# Patient Record
Sex: FEMALE | Race: NATIVE HAWAIIAN OR OTHER PACIFIC ISLANDER | HISPANIC OR LATINO
[De-identification: names, ages, dates, MRNs, and addresses within clinical notes are randomized per-mention and may not be internally consistent; named-entity substitution may affect disease eponyms.]

---

## 2019-08-27 ENCOUNTER — TRANSCRIPTION ENCOUNTER (OUTPATIENT)
Age: 34
End: 2019-08-27

## 2019-10-23 ENCOUNTER — RECORD ABSTRACTING (OUTPATIENT)
Age: 34
End: 2019-10-23

## 2019-10-23 DIAGNOSIS — Z01.419 ENCOUNTER FOR GYNECOLOGICAL EXAMINATION (GENERAL) (ROUTINE) W/OUT ABNORMAL FINDINGS: ICD-10-CM

## 2019-10-23 DIAGNOSIS — Z30.431 ENCOUNTER FOR ROUTINE CHECKING OF INTRAUTERINE CONTRACEPTIVE DEVICE: ICD-10-CM

## 2019-10-23 DIAGNOSIS — Z78.9 OTHER SPECIFIED HEALTH STATUS: ICD-10-CM

## 2019-10-23 DIAGNOSIS — Z83.3 FAMILY HISTORY OF DIABETES MELLITUS: ICD-10-CM

## 2019-10-23 DIAGNOSIS — R10.2 PELVIC AND PERINEAL PAIN: ICD-10-CM

## 2019-10-23 DIAGNOSIS — Z80.3 FAMILY HISTORY OF MALIGNANT NEOPLASM OF BREAST: ICD-10-CM

## 2019-10-23 PROBLEM — Z00.00 ENCOUNTER FOR PREVENTIVE HEALTH EXAMINATION: Status: ACTIVE | Noted: 2019-10-23

## 2019-10-23 LAB — CYTOLOGY CVX/VAG DOC THIN PREP: NEGATIVE

## 2019-10-24 ENCOUNTER — APPOINTMENT (OUTPATIENT)
Dept: OBGYN | Facility: CLINIC | Age: 34
End: 2019-10-24
Payer: MEDICAID

## 2019-10-24 VITALS
SYSTOLIC BLOOD PRESSURE: 110 MMHG | HEIGHT: 64 IN | DIASTOLIC BLOOD PRESSURE: 70 MMHG | BODY MASS INDEX: 18.9 KG/M2 | WEIGHT: 110.7 LBS

## 2019-10-24 DIAGNOSIS — Z11.3 ENCOUNTER FOR SCREENING FOR INFECTIONS WITH A PREDOMINANTLY SEXUAL MODE OF TRANSMISSION: ICD-10-CM

## 2019-10-24 DIAGNOSIS — Z01.419 ENCOUNTER FOR GYNECOLOGICAL EXAMINATION (GENERAL) (ROUTINE) W/OUT ABNORMAL FINDINGS: ICD-10-CM

## 2019-10-24 PROCEDURE — 99395 PREV VISIT EST AGE 18-39: CPT

## 2019-10-24 RX ORDER — COPPER 313.4 MG/1
INTRAUTERINE DEVICE INTRAUTERINE
Refills: 0 | Status: ACTIVE | COMMUNITY

## 2019-10-24 NOTE — HISTORY OF PRESENT ILLNESS
[Normal Amount/Duration] : was of a normal amount and duration [Frequency: Q ___ days] : menstrual periods occur approximately every [unfilled] days [Menarche Age: ____] : age at menarche was [unfilled] [Contraception] : uses contraception [Sexually Active] : is sexually active [IUD] : has an intrauterine device [Spotting Between  Menses] : no spotting between menses [de-identified] : ParaGard

## 2019-10-25 LAB
HAV IGM SER QL: NONREACTIVE
HBV CORE IGM SER QL: NONREACTIVE
HBV SURFACE AG SER QL: NONREACTIVE
HCV AB SER QL: NONREACTIVE
HCV S/CO RATIO: 0.2 S/CO
HIV1+2 AB SPEC QL IA.RAPID: NONREACTIVE
T PALLIDUM AB SER QL IA: NEGATIVE

## 2019-10-28 LAB
HBV SURFACE AG SER QL: NONREACTIVE
HCV AB SER QL: NONREACTIVE
HCV S/CO RATIO: 0.21 S/CO
HPV HIGH+LOW RISK DNA PNL CVX: NOT DETECTED

## 2019-10-29 LAB
C TRACH RRNA SPEC QL NAA+PROBE: NOT DETECTED
N GONORRHOEA RRNA SPEC QL NAA+PROBE: NOT DETECTED
SOURCE TP AMPLIFICATION: NORMAL

## 2019-10-31 LAB — CYTOLOGY CVX/VAG DOC THIN PREP: NORMAL

## 2019-12-25 PROBLEM — R10.2 PELVIC PAIN IN FEMALE: Status: RESOLVED | Noted: 2019-10-23 | Resolved: 2019-12-25

## 2020-03-12 ENCOUNTER — TRANSCRIPTION ENCOUNTER (OUTPATIENT)
Age: 35
End: 2020-03-12

## 2020-12-21 PROBLEM — Z01.419 ENCOUNTER FOR GYNECOLOGICAL EXAMINATION: Status: RESOLVED | Noted: 2019-10-24 | Resolved: 2020-12-21

## 2022-07-13 ENCOUNTER — APPOINTMENT (OUTPATIENT)
Dept: OBGYN | Facility: CLINIC | Age: 37
End: 2022-07-13

## 2022-07-29 ENCOUNTER — APPOINTMENT (OUTPATIENT)
Dept: OBGYN | Facility: CLINIC | Age: 37
End: 2022-07-29

## 2022-08-24 ENCOUNTER — APPOINTMENT (OUTPATIENT)
Dept: OBGYN | Facility: CLINIC | Age: 37
End: 2022-08-24

## 2022-08-24 VITALS
HEIGHT: 64 IN | DIASTOLIC BLOOD PRESSURE: 60 MMHG | SYSTOLIC BLOOD PRESSURE: 100 MMHG | WEIGHT: 117.44 LBS | BODY MASS INDEX: 20.05 KG/M2

## 2022-08-24 DIAGNOSIS — T83.32XA DISPLACEMENT OF INTRAUTERINE CONTRACEPTIVE DEVICE, INITIAL ENCOUNTER: ICD-10-CM

## 2022-08-24 DIAGNOSIS — Z30.432 ENCOUNTER FOR REMOVAL OF INTRAUTERINE CONTRACEPTIVE DEVICE: ICD-10-CM

## 2022-08-24 LAB
HCG UR QL: NEGATIVE
QUALITY CONTROL: YES

## 2022-08-24 PROCEDURE — 58301 REMOVE INTRAUTERINE DEVICE: CPT

## 2022-08-24 PROCEDURE — 81025 URINE PREGNANCY TEST: CPT

## 2022-08-24 NOTE — PROCEDURE
[IUD Removal] : intrauterine device (IUD) removal [Time out performed] : Pre-procedure time out performed.  Patient's name, date of birth and procedure confirmed. [Consent Obtained] : Consent obtained [Fertility Desired] : fertility desired [Risks] : risks [Benefits] : benefits [Alternatives] : alternatives [Patient] : patient [Speculum Placed] : speculum placed [Strings Visualized] : strings visualized [IUD Discarded] : IUD discarded [Sent to Pathology] : specimen was placed in buffered formalin and sent for pathology [Tolerated Well] : Patient tolerated the procedure well [No Complications] : no complications [Heavy Vaginal Bleeding] : for heavy vaginal bleeding [Pelvic Pain] : for pelvic pain [de-identified] : The strings were grasped.  Resistance was noted.  Upon removal of the IUD a portion of the IUD was noted to be missing.  A hysteroscopy was then performed revealing that portion to be embedded in the uterine wall.

## 2022-09-02 ENCOUNTER — APPOINTMENT (OUTPATIENT)
Dept: OBGYN | Facility: CLINIC | Age: 37
End: 2022-09-02

## 2022-09-02 VITALS
HEIGHT: 64 IN | SYSTOLIC BLOOD PRESSURE: 112 MMHG | DIASTOLIC BLOOD PRESSURE: 63 MMHG | BODY MASS INDEX: 19.97 KG/M2 | WEIGHT: 117 LBS

## 2022-09-02 DIAGNOSIS — T83.39XA OTHER MECHANICAL COMPLICATION OF INTRAUTERINE CONTRACEPTIVE DEVICE, INITIAL ENCOUNTER: ICD-10-CM

## 2022-09-02 PROCEDURE — 81025 URINE PREGNANCY TEST: CPT

## 2022-09-02 PROCEDURE — 99213 OFFICE O/P EST LOW 20 MIN: CPT

## 2022-09-02 NOTE — PROCEDURE
[Hysteroscopy] : Hysteroscopy [Time out performed] : Pre-procedure time out performed.  Patient's name, date of birth and procedure confirmed. [Consent Obtained] : Consent obtained [Risks] : risks [Benefits] : benefits [Alternatives] : alternatives [Patient] : patient [Infection] : infection [Bleeding] : bleeding [Allergic Reaction] : allergic reaction [flexible] : Using aseptic technique a hysteroscopy was performed using a flexible hysteroscope [Hemostasis obtained] : hemostasis obtained [Tolerated Well] : Patient tolerated the procedure well [Aftercare instructions/regstrictions given and follow-up scheduled] : Aftercare instructions/restrictions given and follow-up scheduled [de-identified] : removal of portion of IUD [de-identified] : portion of IUD not seen

## 2022-09-03 LAB
HCG UR QL: NEGATIVE
QUALITY CONTROL: YES

## 2023-11-02 ENCOUNTER — NON-APPOINTMENT (OUTPATIENT)
Age: 38
End: 2023-11-02

## 2023-11-03 ENCOUNTER — NON-APPOINTMENT (OUTPATIENT)
Age: 38
End: 2023-11-03

## 2023-12-01 ENCOUNTER — APPOINTMENT (OUTPATIENT)
Dept: ANTEPARTUM | Facility: CLINIC | Age: 38
End: 2023-12-01

## 2023-12-23 ENCOUNTER — RESULT REVIEW (OUTPATIENT)
Age: 38
End: 2023-12-23

## 2023-12-23 ENCOUNTER — OUTPATIENT (OUTPATIENT)
Dept: OUTPATIENT SERVICES | Facility: HOSPITAL | Age: 38
LOS: 1 days | End: 2023-12-23
Payer: MEDICARE

## 2023-12-23 VITALS — HEART RATE: 110 BPM | SYSTOLIC BLOOD PRESSURE: 120 MMHG | DIASTOLIC BLOOD PRESSURE: 63 MMHG

## 2023-12-23 VITALS — DIASTOLIC BLOOD PRESSURE: 56 MMHG | SYSTOLIC BLOOD PRESSURE: 99 MMHG | HEART RATE: 82 BPM

## 2023-12-23 DIAGNOSIS — O26.899 OTHER SPECIFIED PREGNANCY RELATED CONDITIONS, UNSPECIFIED TRIMESTER: ICD-10-CM

## 2023-12-23 LAB
ALBUMIN SERPL ELPH-MCNC: 3.1 G/DL — LOW (ref 3.3–5.2)
ALBUMIN SERPL ELPH-MCNC: 3.1 G/DL — LOW (ref 3.3–5.2)
ALP SERPL-CCNC: 109 U/L — SIGNIFICANT CHANGE UP (ref 40–120)
ALP SERPL-CCNC: 109 U/L — SIGNIFICANT CHANGE UP (ref 40–120)
ALT FLD-CCNC: 25 U/L — SIGNIFICANT CHANGE UP
ALT FLD-CCNC: 25 U/L — SIGNIFICANT CHANGE UP
AMYLASE P1 CFR SERPL: 83 U/L — SIGNIFICANT CHANGE UP (ref 36–128)
AMYLASE P1 CFR SERPL: 83 U/L — SIGNIFICANT CHANGE UP (ref 36–128)
ANION GAP SERPL CALC-SCNC: 16 MMOL/L — SIGNIFICANT CHANGE UP (ref 5–17)
ANION GAP SERPL CALC-SCNC: 16 MMOL/L — SIGNIFICANT CHANGE UP (ref 5–17)
AST SERPL-CCNC: 26 U/L — SIGNIFICANT CHANGE UP
AST SERPL-CCNC: 26 U/L — SIGNIFICANT CHANGE UP
BILIRUB SERPL-MCNC: 0.3 MG/DL — LOW (ref 0.4–2)
BILIRUB SERPL-MCNC: 0.3 MG/DL — LOW (ref 0.4–2)
BUN SERPL-MCNC: 8.2 MG/DL — SIGNIFICANT CHANGE UP (ref 8–20)
BUN SERPL-MCNC: 8.2 MG/DL — SIGNIFICANT CHANGE UP (ref 8–20)
CALCIUM SERPL-MCNC: 8.7 MG/DL — SIGNIFICANT CHANGE UP (ref 8.4–10.5)
CALCIUM SERPL-MCNC: 8.7 MG/DL — SIGNIFICANT CHANGE UP (ref 8.4–10.5)
CHLORIDE SERPL-SCNC: 100 MMOL/L — SIGNIFICANT CHANGE UP (ref 96–108)
CHLORIDE SERPL-SCNC: 100 MMOL/L — SIGNIFICANT CHANGE UP (ref 96–108)
CO2 SERPL-SCNC: 18 MMOL/L — LOW (ref 22–29)
CO2 SERPL-SCNC: 18 MMOL/L — LOW (ref 22–29)
CREAT SERPL-MCNC: 0.34 MG/DL — LOW (ref 0.5–1.3)
CREAT SERPL-MCNC: 0.34 MG/DL — LOW (ref 0.5–1.3)
EGFR: 137 ML/MIN/1.73M2 — SIGNIFICANT CHANGE UP
EGFR: 137 ML/MIN/1.73M2 — SIGNIFICANT CHANGE UP
GLUCOSE SERPL-MCNC: 74 MG/DL — SIGNIFICANT CHANGE UP (ref 70–99)
GLUCOSE SERPL-MCNC: 74 MG/DL — SIGNIFICANT CHANGE UP (ref 70–99)
HCT VFR BLD CALC: 27.8 % — LOW (ref 34.5–45)
HCT VFR BLD CALC: 27.8 % — LOW (ref 34.5–45)
HGB BLD-MCNC: 9.1 G/DL — LOW (ref 11.5–15.5)
HGB BLD-MCNC: 9.1 G/DL — LOW (ref 11.5–15.5)
LIDOCAIN IGE QN: 30 U/L — SIGNIFICANT CHANGE UP (ref 22–51)
LIDOCAIN IGE QN: 30 U/L — SIGNIFICANT CHANGE UP (ref 22–51)
MCHC RBC-ENTMCNC: 25.6 PG — LOW (ref 27–34)
MCHC RBC-ENTMCNC: 25.6 PG — LOW (ref 27–34)
MCHC RBC-ENTMCNC: 32.7 GM/DL — SIGNIFICANT CHANGE UP (ref 32–36)
MCHC RBC-ENTMCNC: 32.7 GM/DL — SIGNIFICANT CHANGE UP (ref 32–36)
MCV RBC AUTO: 78.3 FL — LOW (ref 80–100)
MCV RBC AUTO: 78.3 FL — LOW (ref 80–100)
PLATELET # BLD AUTO: 341 K/UL — SIGNIFICANT CHANGE UP (ref 150–400)
PLATELET # BLD AUTO: 341 K/UL — SIGNIFICANT CHANGE UP (ref 150–400)
POTASSIUM SERPL-MCNC: 4 MMOL/L — SIGNIFICANT CHANGE UP (ref 3.5–5.3)
POTASSIUM SERPL-MCNC: 4 MMOL/L — SIGNIFICANT CHANGE UP (ref 3.5–5.3)
POTASSIUM SERPL-SCNC: 4 MMOL/L — SIGNIFICANT CHANGE UP (ref 3.5–5.3)
POTASSIUM SERPL-SCNC: 4 MMOL/L — SIGNIFICANT CHANGE UP (ref 3.5–5.3)
PROT SERPL-MCNC: 6.3 G/DL — LOW (ref 6.6–8.7)
PROT SERPL-MCNC: 6.3 G/DL — LOW (ref 6.6–8.7)
RBC # BLD: 3.55 M/UL — LOW (ref 3.8–5.2)
RBC # BLD: 3.55 M/UL — LOW (ref 3.8–5.2)
RBC # FLD: 13.7 % — SIGNIFICANT CHANGE UP (ref 10.3–14.5)
RBC # FLD: 13.7 % — SIGNIFICANT CHANGE UP (ref 10.3–14.5)
SODIUM SERPL-SCNC: 134 MMOL/L — LOW (ref 135–145)
SODIUM SERPL-SCNC: 134 MMOL/L — LOW (ref 135–145)
WBC # BLD: 10.39 K/UL — SIGNIFICANT CHANGE UP (ref 3.8–10.5)
WBC # BLD: 10.39 K/UL — SIGNIFICANT CHANGE UP (ref 3.8–10.5)
WBC # FLD AUTO: 10.39 K/UL — SIGNIFICANT CHANGE UP (ref 3.8–10.5)
WBC # FLD AUTO: 10.39 K/UL — SIGNIFICANT CHANGE UP (ref 3.8–10.5)

## 2023-12-23 PROCEDURE — 85027 COMPLETE CBC AUTOMATED: CPT

## 2023-12-23 PROCEDURE — 96375 TX/PRO/DX INJ NEW DRUG ADDON: CPT

## 2023-12-23 PROCEDURE — 76705 ECHO EXAM OF ABDOMEN: CPT

## 2023-12-23 PROCEDURE — 96372 THER/PROPH/DIAG INJ SC/IM: CPT

## 2023-12-23 PROCEDURE — G0463: CPT

## 2023-12-23 PROCEDURE — 80053 COMPREHEN METABOLIC PANEL: CPT

## 2023-12-23 PROCEDURE — 59025 FETAL NON-STRESS TEST: CPT

## 2023-12-23 PROCEDURE — 36415 COLL VENOUS BLD VENIPUNCTURE: CPT

## 2023-12-23 PROCEDURE — 82150 ASSAY OF AMYLASE: CPT

## 2023-12-23 PROCEDURE — 76705 ECHO EXAM OF ABDOMEN: CPT | Mod: 26

## 2023-12-23 PROCEDURE — 96374 THER/PROPH/DIAG INJ IV PUSH: CPT

## 2023-12-23 PROCEDURE — 83690 ASSAY OF LIPASE: CPT

## 2023-12-23 RX ORDER — ACETAMINOPHEN 500 MG
975 TABLET ORAL ONCE
Refills: 0 | Status: DISCONTINUED | OUTPATIENT
Start: 2023-12-23 | End: 2023-12-23

## 2023-12-23 RX ORDER — SODIUM CHLORIDE 9 MG/ML
1000 INJECTION, SOLUTION INTRAVENOUS ONCE
Refills: 0 | Status: DISCONTINUED | OUTPATIENT
Start: 2023-12-23 | End: 2024-01-07

## 2023-12-23 RX ORDER — ACETAMINOPHEN 500 MG
1000 TABLET ORAL ONCE
Refills: 0 | Status: COMPLETED | OUTPATIENT
Start: 2023-12-23 | End: 2023-12-23

## 2023-12-23 RX ORDER — FAMOTIDINE 10 MG/ML
20 INJECTION INTRAVENOUS ONCE
Refills: 0 | Status: COMPLETED | OUTPATIENT
Start: 2023-12-23 | End: 2023-12-23

## 2023-12-23 RX ADMIN — Medication 12 MILLIGRAM(S): at 22:36

## 2023-12-23 RX ADMIN — Medication 1000 MILLIGRAM(S): at 21:00

## 2023-12-23 RX ADMIN — Medication 400 MILLIGRAM(S): at 20:03

## 2023-12-23 RX ADMIN — FAMOTIDINE 20 MILLIGRAM(S): 10 INJECTION INTRAVENOUS at 20:12

## 2023-12-23 NOTE — OB PROVIDER TRIAGE NOTE - NSHPPHYSICALEXAM_GEN_ALL_CORE
T(C): 36.5 (12-23-23 @ 19:00), Max: 36.5 (12-23-23 @ 19:00)  HR: 95 (12-23-23 @ 19:26) (95 - 110)  BP: 127/61 (12-23-23 @ 19:26) (120/63 - 127/61)  RR: 17 (12-23-23 @ 19:00) (17 - 17)    Gen: well-appearing. Very uncomfortable with abdominal pain when present.   Abd: soft, gravid. Tender in RUQ. No tenderness in lower abdomen.   Ext: non-edematous, non-tender   SVE: 1/0/-3 firm  SSE: deferred  FHT: baseline 140, moderate variability, +accels, -decels   [place]: ctx q 2-4min T(C): 36.5 (12-23-23 @ 19:00), Max: 36.5 (12-23-23 @ 19:00)  HR: 95 (12-23-23 @ 19:26) (95 - 110)  BP: 127/61 (12-23-23 @ 19:26) (120/63 - 127/61)  RR: 17 (12-23-23 @ 19:00) (17 - 17)    Gen: well-appearing. Very uncomfortable with abdominal pain when present.   Abd: soft, gravid. Tender in RUQ. No tenderness in lower abdomen.   Ext: non-edematous, non-tender   SVE: 1/0/-3 firm  SSE: deferred  FHT: baseline 140, moderate variability, +accels, -decels   [place]: ctx q 2-4min T(C): 36.5 (12-23-23 @ 19:00), Max: 36.5 (12-23-23 @ 19:00)  HR: 95 (12-23-23 @ 19:26) (95 - 110)  BP: 127/61 (12-23-23 @ 19:26) (120/63 - 127/61)  RR: 17 (12-23-23 @ 19:00) (17 - 17)    Gen: well-appearing. Very uncomfortable with abdominal pain when present.   Abd: soft, gravid. Tender in RUQ. No tenderness in lower abdomen.   Ext: non-edematous, non-tender   SVE: 1/0/-3 firm  SSE: deferred  FHT: baseline 140, moderate variability, +accels, -decels   [place]: ctx q 2-4min  Bedside sono: vertex, BPP 8/8. SHIRLEY 14.4 T(C): 36.5 (12-23-23 @ 19:00), Max: 36.5 (12-23-23 @ 19:00)  HR: 95 (12-23-23 @ 19:26) (95 - 110)  BP: 127/61 (12-23-23 @ 19:26) (120/63 - 127/61)  RR: 17 (12-23-23 @ 19:00) (17 - 17)    Gen: well-appearing. Very uncomfortable with abdominal pain when present.   Abd: soft, gravid. Tender in RUQ. No tenderness in lower abdomen.   Ext: non-edematous, non-tender   SVE: 1/0/-3 firm  SSE: deferred  FHT: baseline 140, moderate variability, +accels, -decels   [place]: ctx q 2-4min  Bedside sono: vertex, BPP 8/8. SHIRLEY 14.4

## 2023-12-23 NOTE — OB RN PATIENT PROFILE - BREASTFEEDING MOTHER TAUGHT HOW TO HAND EXPRESS THEIR OWN MILK
Max is eating ad livia demand and taking 55 ml, 20 ml and 30 ml. Circ completed and baby is frantic and hungry and will not suck on bottle, NT full feeding of 70 ml with no issues. Max sleeping after NT feeding. Circ is healing, scant bleeding noted, no void since circumcision, mom and dad taught circumcision care and are able to talk back plan. Baby has void and stool this shift. No apnea, bradycardia or desaturations.    Statement Selected

## 2023-12-23 NOTE — OB PROVIDER TRIAGE NOTE - NSHPLABSRESULTS_GEN_ALL_CORE
9.1    10.39 )-----------( 341      ( 23 Dec 2023 19:50 )             27.8     12-23    134<L>  |  100  |  8.2  ----------------------------<  74  4.0   |  18.0<L>  |  0.34<L>    Ca    8.7      23 Dec 2023 19:50    TPro  6.3<L>  /  Alb  3.1<L>  /  TBili  0.3<L>  /  DBili  x   /  AST  26  /  ALT  25  /  AlkPhos  109  12-23      Amylase: 83 U/L (12.23.23 @ 19:50)  Lipase: 30 U/L (12.23.23 @ 19:50)

## 2023-12-23 NOTE — OB PROVIDER TRIAGE NOTE - NSOBPROVIDERNOTE_OBGYN_ALL_OB_FT
A/P: 38y  at 34w4d GA who presents to L&D for intermittent upper abdominal pain that started earlier today  -IVF for contractions  -Ofirmev and Pepcid for symptomatic relief  -Cervical exam /-3 and still firm. Consider re-check   -RUQ sono ordered.  -CBC, CMP, amylase and lipase pending  Fetus: reactive reassuring  [place]: ctx 2-4min  Dispo: Continue to observe.     Discussed with  A/P: 38y  at 34w4d GA who presents to L&D for intermittent upper abdominal pain that started earlier today  -IVF for contractions  -Ofirmev and Pepcid for symptomatic relief  -Cervical exam /-3 and still firm. Consider re-check   -RUQ sono ordered.  -CBC, CMP, amylase and lipase pending  Fetus: reactive reassuring  [place]: ctx 2-4min  Dispo: Continue to observe.     Discussed with  A/P: 38y  at 34w4d GA who presents to L&D for intermittent upper abdominal pain that started earlier today  -IVF for contractions  -Ofirmev and Pepcid for symptomatic relief  -Cervical exam /-3 and still firm. Consider re-check   -RUQ sono ordered.  -CBC, CMP, amylase and lipase pending  Fetus: reactive reassuring  [place]: ctx 2-4min  Dispo: Continue to observe.     Discussed with  and Dr. Gamez    +++++++ Addendum+++    Pt seen and examined. Agree with above. Labs pending. BPP . Pain improved with IV tylenol.   Will continue to observe. A/P: 38y  at 34w4d GA who presents to L&D for intermittent upper abdominal pain that started earlier today  -IVF for contractions  -Ofirmev and Pepcid for symptomatic relief  -Cervical exam /-3 and still firm. Consider re-check   -RUQ sono ordered.  -CBC, CMP, amylase and lipase pending  Fetus: reactive reassuring  [place]: ctx 2-4min  Dispo: Continue to observe.     Discussed with  and Dr. Gamez    +++++++ Addendum+++    Pt seen and examined. Agree with above. Labs pending. BPP . Pain improved with IV tylenol.   Will continue to observe. A/P: 38y  at 34w4d GA who presents to L&D for intermittent upper abdominal pain that started earlier today  -IVF for contractions  -Ofirmev and Pepcid for symptomatic relief  -Cervical exam /3 and still firm. Exam is unchanged on re-exam several hours later  -RUQ sono in progress  -CBC, CMP, amylase and lipase wnl. Anemia of 9.1, recommended restarting iron supplementation  Fetus: reactive reassuring  [place]: ctx 2-4min  Dispo: Continue to observe.     Discussed with  and Dr. Gamez    +++++++ Addendum+++    Pt seen and examined. Agree with above. Labs pending. BPP . Pain improved with IV tylenol.   Will continue to observe.      Addendum [place] PGY1 @ 2200:  -Pt has symptomatic relief. Pain is resolved.   -Cervical exam is stable on re-check  -Recommended Iron supplementation for anemia and Tylenol for pain relief at home  -Reccomended to call OB provider if pain returns  -Pending RUQ sono A/P: 38y  at 34w4d GA who presents to L&D for intermittent upper abdominal pain that started earlier today  -IVF for contractions  -Ofirmev and Pepcid for symptomatic relief  -Cervical exam /3 and still firm. Exam is unchanged on re-exam several hours later  -RUQ sono in progress  -CBC, CMP, amylase and lipase wnl. Anemia of 9.1, recommended restarting iron supplementation  Fetus: reactive reassuring  [place]: ctx 2-4min  Dispo: Continue to observe.     Discussed with  and Dr. Gamez    +++++++ Addendum+++    Pt seen and examined. Agree with above. Labs pending. BPP . Pain improved with IV tylenol.   Will continue to observe.      Addendum [place] PGY1 @ 2200:  -Pt has symptomatic relief. Pain is resolved.   -Cervical exam is stable on re-check  -Recommended Iron supplementation for anemia and Tylenol for pain relief at home  -Reccomended to call OB provider if pain returns  -Pending RUQ sono A/P: 38y  at 34w4d GA who presents to L&D for intermittent upper abdominal pain that started earlier today  -IVF for contractions  -Ofirmev and Pepcid for symptomatic relief  -Cervical exam /-3 and still firm. Exam is unchanged on re-exam several hours later  -RUQ sono in progress  -CBC, CMP, amylase and lipase wnl. Anemia of 9.1, recommended restarting iron supplementation  Fetus: reactive reassuring  [place]: ctx 2-4min  Dispo: Continue to observe.     Discussed with  and Dr. Gamez    +++++++ Addendum+++    Pt seen and examined. Agree with above. Labs pending. BPP . Pain improved with IV tylenol.   Will continue to observe.      Addendum [place] PGY1 @ 2200:  -Pt has symptomatic relief. Pain is resolved.   -Cervical exam is stable on re-check  -Recommended Iron supplementation for anemia and Tylenol for pain relief at home  -Reccomended to call OB provider if pain returns  -Pending RUQ sono  -Ctx have spaced out on toco and decreased in intensity clinically A/P: 38y  at 34w4d GA who presents to L&D for intermittent upper abdominal pain that started earlier today  -IVF for contractions  -Ofirmev and Pepcid for symptomatic relief  -Cervical exam /-3 and still firm. Exam is unchanged on re-exam several hours later  -RUQ sono in progress  -CBC, CMP, amylase and lipase wnl. Anemia of 9.1, recommended restarting iron supplementation  Fetus: reactive reassuring  [place]: ctx 2-4min  Dispo: Continue to observe.     Discussed with  and Dr. Gamez    +++++++ Addendum+++    Pt seen and examined. Agree with above. Labs pending. BPP . Pain improved with IV tylenol.   Will continue to observe.      Addendum [place] PGY1 @ 2200:  -Pt has symptomatic relief. Pain is resolved.   -Cervical exam is stable on re-check  -Recommended Iron supplementation for anemia and Tylenol for pain relief at home  -Reccomended to call OB provider if pain returns  -Pending RUQ sono  -Ctx have spaced out on toco and decreased in intensity clinically A/P: 38y  at 34w4d GA who presents to L&D for intermittent upper abdominal pain that started earlier today  -IVF for contractions  -Ofirmev and Pepcid for symptomatic relief  -Cervical exam /-3 and still firm. Exam is unchanged on re-exam several hours later  -RUQ sono in progress  -CBC, CMP, amylase and lipase wnl. Anemia of 9.1, recommended restarting iron supplementation  Fetus: reactive reassuring  [place]: ctx 2-4min  Dispo: Continue to observe.     Discussed with  and Dr. Gamez    +++++++ Addendum+++    Pt seen and examined. Agree with above. Labs pending. BPP . Pain improved with IV tylenol.   Will continue to observe.      Addendum [place] PGY1 @ 2200:  -Pt has symptomatic relief. Pain is resolved.   -Cervical exam is stable on re-check  -Recommended Iron supplementation for anemia and Tylenol for pain relief at home  -Reccomended to call OB provider if pain returns  -RUQ sono read wnl  -Ctx have spaced out on toco and decreased in intensity clinically  -Betamethasone now for fetal lung maturity. Discussed this with the patient. She will return tomorrow night for second dose of betamethasone, NST, and cervical exam. Return precautions discussed at length with the patient.    Dispo: stable for d/c home with return to triage tomorrow night    Discussed with Dr. Gamez and Dr Colon A/P: 38y  at 34w4d GA who presents to L&D for intermittent upper abdominal pain that started earlier today  -IVF for contractions  -Ofirmev and Pepcid for symptomatic relief  -Cervical exam /-3 and still firm. Exam is unchanged on re-exam several hours later  -RUQ sono in progress  -CBC, CMP, amylase and lipase wnl. Anemia of 9.1, recommended restarting iron supplementation  Fetus: reactive reassuring  [place]: ctx 2-4min  Dispo: Continue to observe.     Discussed with  and Dr. Gamez    +++++++ Addendum+++    Pt seen and examined. Agree with above. Labs pending. BPP . Pain improved with IV tylenol.   Will continue to observe.      Addendum [place] PGY1 @ 2200:  -Pt has symptomatic relief. Pain is resolved.   -Cervical exam is stable on re-check  -Recommended Iron supplementation for anemia and Tylenol for pain relief at home  -Reccomended to call OB provider if pain returns  -RUQ sono read wnl  -Ctx have spaced out on toco and decreased in intensity clinically  -Betamethasone now for fetal lung maturity. Discussed this with the patient. She will return tomorrow night for second dose of betamethasone, NST, and cervical exam. Return precautions discussed at length with the patient.    Dispo: stable for d/c home with return to triage tomorrow night    Discussed with Dr. Gamez and Dr Colon

## 2023-12-23 NOTE — OB RN PATIENT PROFILE - FALL HARM RISK - UNIVERSAL INTERVENTIONS
Bed in lowest position, wheels locked, appropriate side rails in place/Call bell, personal items and telephone in reach/Instruct patient to call for assistance before getting out of bed or chair/Non-slip footwear when patient is out of bed/[place] to call system/Physically safe environment - no spills, clutter or unnecessary equipment/Purposeful Proactive Rounding/Room/bathroom lighting operational, light cord in reach Bed in lowest position, wheels locked, appropriate side rails in place/Call bell, personal items and telephone in reach/Instruct patient to call for assistance before getting out of bed or chair/Non-slip footwear when patient is out of bed/[place] to call system/Physically safe environment - no spills, clutter or unnecessary equipment/Purposeful Proactive Rounding/Room/bathroom lighting operational, light cord in reach

## 2023-12-23 NOTE — OB PROVIDER TRIAGE NOTE - ATTENDING COMMENTS
Pt seen and examined. Agree with above. Labs pending. BPP 8/8. Pain improved with IV tylenol.   RUQ WNL. CTX spaced. DC home with return precautions. Repeat BMZ tomorrow.

## 2023-12-23 NOTE — OB RN TRIAGE NOTE - FALL HARM RISK - UNIVERSAL INTERVENTIONS
Bed in lowest position, wheels locked, appropriate side rails in place/Call bell, personal items and telephone in reach/Instruct patient to call for assistance before getting out of bed or chair/Non-slip footwear when patient is out of bed/[place] to call system/Physically safe environment - no spills, clutter or unnecessary equipment/Purposeful Proactive Rounding/Room/bathroom lighting operational, light cord in reach Bed in lowest position, wheels locked, appropriate side rails in place/Call bell, personal items and telephone in reach/Instruct patient to call for assistance before getting out of bed or chair/Non-slip footwear when patient is out of bed/[place] to call system/Physically safe environment - no spills, clutter or unnecessary equipment/Purposeful Proactive Rounding/Room/bathroom lighting operational, light cord in reach

## 2023-12-23 NOTE — OB PROVIDER TRIAGE NOTE - HISTORY OF PRESENT ILLNESS
ADRIANA COX is a 38y  at 34w4d GA who presents to L&D for intermittent upper abdominal pain that started earlier today. The pain was constant and is now intermittent diffusely across the top of the abdomen and radiating to the back bilaterally. Not related to eating. Diet consisted of eggs/bread/cheese this AM and apple this afternoon. Last ate @ 3pm. Episode of dizziness this morning that resolved. One episode of vomiting this morning. She has not tried any medications at home for the pain  Pt denies vaginal bleeding and leakage of fluid. She endorses good fetal movement.   She denies any urinary complaints.   She denies fevers, chills, nausea.  She denies shortness of breath, chest pain, and palpitations.    Pregnancy course is  uncomplicated. EFW 20th%tile on last sono    POB:   uncomplicated. 2 TOP with D&C "years ago"  PGYN: -fibroids/-cysts, denied STD hx, denies abnormal PAPs  PMH: denies  PSH: D&C x2  SH: Denies tobacco use, EtOH use and illicit drug use during the pregnancy; Feels safe at home  Meds:  All:    T(C): 36.5 (-23 @ 19:00), Max: 36.5 (--23 @ 19:00)  HR: 95 (--23 @ 19:26) (95 - 110)  BP: 127/61 (--23 @ 19:26) (120/63 - 127/61)  RR: 17 (23 @ 19:00) (17 - 17)  SpO2: --  Gen: NAD, well-appearing  Heart: S1 S2, RRR  Lungs: CTAB  Abd: soft, gravid  Ext: non-edematous, non-tender   SVE:   SSE: cervix visualized, closed and without any signs of bleeding or drainage, no pooling   FHT:   [place]:    A/P:     Fetus:  [place]:  Dispo: Continue to observe.     Discussed with    ADRIANA COX is a 38y  at 34w4d GA who presents to L&D for intermittent upper abdominal pain that started earlier today. The pain was constant and is now intermittent diffusely across the top of the abdomen and radiating to the back bilaterally. Not related to eating. Diet consisted of eggs/bread/cheese this AM and apple this afternoon. Last ate @ 3pm. Episode of dizziness this morning that resolved. One episode of vomiting this morning. She has not tried any medications at home for the pain  Pt denies vaginal bleeding and leakage of fluid. She endorses good fetal movement.   She denies any urinary complaints.   She denies fevers, chills, nausea.  She denies shortness of breath, chest pain, and palpitations.    Pregnancy course is  uncomplicated. EFW 20th%tile on last sono    POB:   uncomplicated. 2 TOP with D&C "years ago"  PGYN: -fibroids/-cysts, denied STD hx, denies abnormal PAPs  PMH: denies  PSH: D&C x2  SH: Denies tobacco use, EtOH use and illicit drug use during the pregnancy; Feels safe at home  Meds:  All:    T(C): 36.5 (-23 @ 19:00), Max: 36.5 (--23 @ 19:00)  HR: 95 (--23 @ 19:26) (95 - 110)  BP: 127/61 (--23 @ 19:26) (120/63 - 127/61)  RR: 17 (23 @ 19:00) (17 - 17)  SpO2: --  Gen: NAD, well-appearing  Heart: S1 S2, RRR  Lungs: CTAB  Abd: soft, gravid  Ext: non-edematous, non-tender   SVE:   SSE: cervix visualized, closed and without any signs of bleeding or drainage, no pooling   FHT:   [place]:    A/P:     Fetus:  [place]:  Dispo: Continue to observe.     Discussed with    ADRIANA COX is a 38y  at 34w4d GA who presents to L&D for intermittent upper abdominal pain that started earlier today. The pain was constant and is now intermittent diffusely across the top of the abdomen and radiating to the back bilaterally. Not related to eating. Diet consisted of eggs/bread/cheese this AM and apple this afternoon. Last ate @ 3pm. Episode of dizziness this morning that resolved. One episode of vomiting this morning. She has not tried any medications at home for the pain  Pt denies vaginal bleeding and leakage of fluid. She endorses good fetal movement.   She denies any urinary complaints.   She denies fevers, chills, nausea.  She denies shortness of breath, chest pain, and palpitations.    Pregnancy course is  uncomplicated. EFW 20th%tile on last sono    POB:   uncomplicated. 2 TOP with D&C "years ago"  PGYN: -fibroids/-cysts, denied STD hx, denies abnormal PAPs  PMH: denies  PSH: D&C x2  SH: Denies tobacco use, EtOH use and illicit drug use during the pregnancy; Feels safe at home  Meds: PNV  All: NKDA

## 2023-12-24 ENCOUNTER — OUTPATIENT (OUTPATIENT)
Dept: INPATIENT UNIT | Facility: HOSPITAL | Age: 38
LOS: 1 days | End: 2023-12-24
Payer: MEDICARE

## 2023-12-24 DIAGNOSIS — O26.899 OTHER SPECIFIED PREGNANCY RELATED CONDITIONS, UNSPECIFIED TRIMESTER: ICD-10-CM

## 2023-12-25 VITALS — OXYGEN SATURATION: 99 % | HEART RATE: 113 BPM

## 2023-12-25 VITALS
DIASTOLIC BLOOD PRESSURE: 55 MMHG | TEMPERATURE: 98 F | HEART RATE: 111 BPM | OXYGEN SATURATION: 100 % | RESPIRATION RATE: 18 BRPM | SYSTOLIC BLOOD PRESSURE: 112 MMHG

## 2023-12-25 LAB
APPEARANCE UR: CLEAR — SIGNIFICANT CHANGE UP
APPEARANCE UR: CLEAR — SIGNIFICANT CHANGE UP
BILIRUB UR-MCNC: NEGATIVE — SIGNIFICANT CHANGE UP
BILIRUB UR-MCNC: NEGATIVE — SIGNIFICANT CHANGE UP
COLOR SPEC: YELLOW — SIGNIFICANT CHANGE UP
COLOR SPEC: YELLOW — SIGNIFICANT CHANGE UP
DIFF PNL FLD: NEGATIVE — SIGNIFICANT CHANGE UP
DIFF PNL FLD: NEGATIVE — SIGNIFICANT CHANGE UP
GLUCOSE UR QL: 100 MG/DL
GLUCOSE UR QL: 100 MG/DL
KETONES UR-MCNC: 40 MG/DL
KETONES UR-MCNC: 40 MG/DL
LEUKOCYTE ESTERASE UR-ACNC: NEGATIVE — SIGNIFICANT CHANGE UP
LEUKOCYTE ESTERASE UR-ACNC: NEGATIVE — SIGNIFICANT CHANGE UP
NITRITE UR-MCNC: NEGATIVE — SIGNIFICANT CHANGE UP
NITRITE UR-MCNC: NEGATIVE — SIGNIFICANT CHANGE UP
PH UR: 6 — SIGNIFICANT CHANGE UP (ref 5–8)
PH UR: 6 — SIGNIFICANT CHANGE UP (ref 5–8)
PROT UR-MCNC: SIGNIFICANT CHANGE UP MG/DL
PROT UR-MCNC: SIGNIFICANT CHANGE UP MG/DL
SP GR SPEC: 1.03 — SIGNIFICANT CHANGE UP (ref 1–1.03)
SP GR SPEC: 1.03 — SIGNIFICANT CHANGE UP (ref 1–1.03)
UROBILINOGEN FLD QL: 1 MG/DL — SIGNIFICANT CHANGE UP (ref 0.2–1)
UROBILINOGEN FLD QL: 1 MG/DL — SIGNIFICANT CHANGE UP (ref 0.2–1)

## 2023-12-25 PROCEDURE — 96372 THER/PROPH/DIAG INJ SC/IM: CPT

## 2023-12-25 PROCEDURE — G0463: CPT

## 2023-12-25 PROCEDURE — 81003 URINALYSIS AUTO W/O SCOPE: CPT

## 2023-12-25 RX ADMIN — Medication 12 MILLIGRAM(S): at 00:47

## 2023-12-25 NOTE — OB PROVIDER TRIAGE NOTE - NSOBPROVIDERNOTE_OBGYN_ALL_OB_FT
Patient is a 37 yo  at 35w GA who presents to L&D for 2nd dose of BMZ.  -NST: reactive, reassuring  -[place]: not shari  -BMZ #2 administered  -U/A sent, will call with results. Patient asymptomatic today.     Dispo: Home. Discussed with Dr. Colon Patient is a 37 yo  at 35w GA who presents to L&D for 2nd dose of BMZ.  -NST: reactive, reassuring  -[place]: not shari  -BMZ #2 administered  -U/A sent, will call with results. Patient asymptomatic today.     Dispo: Home. Discussed with Dr. Colon

## 2023-12-25 NOTE — OB RN TRIAGE NOTE - FALL HARM RISK - UNIVERSAL INTERVENTIONS
Bed in lowest position, wheels locked, appropriate side rails in place/Call bell, personal items and telephone in reach/Instruct patient to call for assistance before getting out of bed or chair/Non-slip footwear when patient is out of bed/[place] to call system/Physically safe environment - no spills, clutter or unnecessary equipment/Purposeful Proactive Rounding/Room/bathroom lighting operational, light cord in reach Bed in lowest position, wheels locked, appropriate side rails in place/Call bell, personal items and telephone in reach/Instruct patient to call for assistance before getting out of bed or chair/Non-slip footwear when patient is out of bed/[place] to call system/Physically safe environment - no spills, clutter or unnecessary equipment/Purposeful Proactive Rounding/Room/bathroom lighting operational, light cord in reach

## 2023-12-25 NOTE — OB RN TRIAGE NOTE - NSICDXPASTMEDICALHX_GEN_ALL_CORE_FT
PAST MEDICAL HISTORY:   (normal spontaneous vaginal delivery)     Termination of pregnancy (fetus)

## 2023-12-25 NOTE — OB PROVIDER TRIAGE NOTE - HISTORY OF PRESENT ILLNESS
ADRIANA COX is a 38y  at 35w GA who presents to L&D for her second dose of betamethasone. On  she had been seen in triage for contractions and was found to be 1/50/-3 on cervical exam. Her exam remained unchanged, however, betamethasone dose #1 was given at that time. Patient is here today for her 2nd dose 24 hours later.  She was also endorsing urinary symptoms from the last visit; however, today she reports she has no urinary complaints. Denies contractions, leakage of fluid, or vaginal bleeding. She reports good fetal movement.     Pregnancy course is uncomplicated. EFW 20th%tile on last sono    POB:   uncomplicated; TOP x2  PGYN: -fibroids/-cysts, denied STD hx, denies abnormal PAPs  PMH: denies  PSH: D&C x2  SH: Denies tobacco use, EtOH use and illicit drug use during the pregnancy; Feels safe at home  Meds: PNV  All: NKDA

## 2023-12-25 NOTE — OB PROVIDER TRIAGE NOTE - NS_SONODONE_OBGYN_ALL_OB
[facility] Medicine  Discharge Summary      Patient Name: Paty Jiménez  MRN: [id number]  Patient Class: IP- Inpatient  Admission Date: 7/15/2022  Hospital Length of Stay: 6 days  Discharge Date and Time:  07/21/2022 9:02 AM  Attending Physician: Hay Montoya MD   Discharging Provider: Hay Montoya MD  Primary Care Provider: To Obtain Unable      HPI:   Mr Paty Jiménez is a 64 y.o. man who presents with weakness. He states he was in his normal state of health until last week. He says he usually runs 2 miles a day and bikes. He has started to feel dizzy- room spinning, not presyncope- with activity. He has nausea and decreased PO intake because he is not able to swallow normally. He cannot say if food gets stuck when swallowing. No pain with swallowing. No abdominal pain. Says that for the last week whenever he urinates he also defecates. He has no headaches, vision changes, numbness. His sister at bedside says he is more confused than usual.     No prior history of this. No long term medical problems. He states that he drinks 3 quarts of beer daily with last drink yesterday. He denies any history of alcohol withdrawal.     In ED, noted to have Na 118 with no recent prior for comparison.       * No surgery found *      Hospital Course:   Mr Paty Jiménez is a 64 y.o. man admitted to ICU on 07/15/22 with symptomatic hyponatremia. Started NS. Overcorrected and started D5. Stabilized Na. Now off IVF, continuing diet and monitoring Na. Most likely cause is alcohol use. TSH, cortisol unremarkable. On no Rx at home that may have caused hyponatremia. US abdomen with no cirrhosis. Also with UTI, started CTX and flomax for urinary hesitancy. Urine culture with E.coli. ID was consulted for continue WBC count of 20K. ID did not think this was infection and Abx stopped.  Rec: dental exam and out patient follow up with heme/onc. Patient was discharged to home on 7/21/22. Activity as tolerated.  Diet regular. Follow up with PCP and heme/onc/dentist 1-2 weeks.        Goals of Care Treatment Preferences:  Code Status: Full Code      Consults:   Consults (From admission, onward)        Status Ordering Provider     Inpatient consult to Infectious Diseases  Once        Provider:  Liv Crowell MD    Completed RAFAL FERNANDES     Inpatient virtual consult to Hospital Medicine  Once        Provider:  Crista Kendall MD    Completed KIANNA QUIROZ.          No new Assessment & Plan notes have been filed under this hospital service since the last note was generated.  Service: Hospital Medicine    Final Active Diagnoses:    Diagnosis Date Noted POA    Leukocytosis [D72.829] 07/17/2022 Yes    Alcohol abuse [F10.10] 07/15/2022 Yes    Anemia, chronic disease [D63.8] 07/15/2022 Yes    Tobacco use disorder [F17.200] 07/15/2022 Yes    Hyperglycemia [R73.9] 07/15/2022 Yes      Problems Resolved During this Admission:    Diagnosis Date Noted Date Resolved POA    PRINCIPAL PROBLEM:  Hyponatremia [E87.1] 07/15/2022 07/21/2022 Yes    Hypokalemia [E87.6] 07/15/2022 07/16/2022 Yes    UTI (urinary tract infection) [N39.0] 07/15/2022 07/21/2022 Yes    Dysphagia [R13.10] 07/15/2022 07/21/2022 Yes       Discharged Condition: good    Disposition: Home or Self Care    Follow Up:   Follow-up Information     [facility] ([place], [place]) [place]. Schedule an appointment as soon as possible for a visit.    Why: For your hospital followup appointment.  Contact information:  [address] [place] [address] [phone]             To Obtain Unable Follow up.           Sandra Yang MD Follow up in 1 week(s).    Specialties: Hematology and Oncology, Hematology  Contact information:  [address]  [address]  [facility] [address] [phone]                       Patient Instructions:   No discharge procedures on file.    Significant Diagnostic Studies:     Pending Diagnostic Studies:     Procedure Component Value Units  Date/Time    CBC auto differential [[id number]]     Order Status: Sent Lab Status: No result     Specimen: Blood          Medications:  Reconciled Home Medications:      Medication List      You have not been prescribed any medications.         Indwelling Lines/Drains at time of discharge:   Lines/Drains/Airways     None                 Time spent on the discharge of patient:  > 35  minutes         Hay Cleveland MD  Department of Hospital Medicine  [facility]   No

## 2023-12-25 NOTE — OB PROVIDER TRIAGE NOTE - NSHPPHYSICALEXAM_GEN_ALL_CORE
ICU Vital Signs Last 24 Hrs  T(C): 36.5 (25 Dec 2023 00:12), Max: 36.5 (25 Dec 2023 00:12)  T(F): 97.7 (25 Dec 2023 00:12), Max: 97.7 (25 Dec 2023 00:12)  HR: 113 (25 Dec 2023 00:49) (96 - 113)  BP: 101/55 (25 Dec 2023 00:47) (101/55 - 112/55)  RR: 18 (25 Dec 2023 00:12) (18 - 18)  SpO2: 99% (25 Dec 2023 00:49) (98% - 100%)    GEN: AAOx3, NAD  ABD: Gravid, nontender to palpation  SVE/SSE: deferred    FHT: 140bpm, +accels, no decels, mod variability  [place]: not shari ICU Vital Signs Last 24 Hrs  T(C): 36.5 (25 Dec 2023 00:12), Max: 36.5 (25 Dec 2023 00:12)  T(F): 97.7 (25 Dec 2023 00:12), Max: 97.7 (25 Dec 2023 00:12)  HR: 113 (25 Dec 2023 00:49) (96 - 113)  BP: 101/55 (25 Dec 2023 00:47) (101/55 - 112/55)  RR: 18 (25 Dec 2023 00:12) (18 - 18)  SpO2: 99% (25 Dec 2023 00:49) (98% - 100%)    GEN: AAOx3, NAD  ABD: Gravid, nontender to palpation  SVE/SSE: deferred    FHT: 140bpm, +accels, no decels, mod variability  [place]: not shari

## 2023-12-27 DIAGNOSIS — O21.2 LATE VOMITING OF PREGNANCY: ICD-10-CM

## 2023-12-27 DIAGNOSIS — O99.013 ANEMIA COMPLICATING PREGNANCY, THIRD TRIMESTER: ICD-10-CM

## 2023-12-27 DIAGNOSIS — O09.523 SUPERVISION OF ELDERLY MULTIGRAVIDA, THIRD TRIMESTER: ICD-10-CM

## 2023-12-27 DIAGNOSIS — O26.893 OTHER SPECIFIED PREGNANCY RELATED CONDITIONS, THIRD TRIMESTER: ICD-10-CM

## 2023-12-27 DIAGNOSIS — D64.9 ANEMIA, UNSPECIFIED: ICD-10-CM

## 2023-12-27 DIAGNOSIS — Z3A.34 34 WEEKS GESTATION OF PREGNANCY: ICD-10-CM

## 2023-12-27 DIAGNOSIS — R10.10 UPPER ABDOMINAL PAIN, UNSPECIFIED: ICD-10-CM

## 2023-12-27 DIAGNOSIS — O47.1 FALSE LABOR AT OR AFTER 37 COMPLETED WEEKS OF GESTATION: ICD-10-CM

## 2023-12-27 DIAGNOSIS — O26.899 OTHER SPECIFIED PREGNANCY RELATED CONDITIONS, UNSPECIFIED TRIMESTER: ICD-10-CM

## 2023-12-27 DIAGNOSIS — Z3A.35 35 WEEKS GESTATION OF PREGNANCY: ICD-10-CM

## 2024-01-27 PROBLEM — Z33.2 ENCOUNTER FOR ELECTIVE TERMINATION OF PREGNANCY: Chronic | Status: ACTIVE | Noted: 2023-12-25

## 2024-01-27 RX ORDER — SODIUM CHLORIDE 9 MG/ML
1000 INJECTION, SOLUTION INTRAVENOUS
Refills: 0 | Status: DISCONTINUED | OUTPATIENT
Start: 2024-01-28 | End: 2024-01-28

## 2024-01-27 RX ORDER — AMPICILLIN TRIHYDRATE 250 MG
1 CAPSULE ORAL EVERY 4 HOURS
Refills: 0 | Status: DISCONTINUED | OUTPATIENT
Start: 2024-01-28 | End: 2024-01-28

## 2024-01-27 RX ORDER — OXYTOCIN 10 UNIT/ML
333.33 VIAL (ML) INJECTION
Qty: 20 | Refills: 0 | Status: DISCONTINUED | OUTPATIENT
Start: 2024-01-28 | End: 2024-01-30

## 2024-01-27 RX ORDER — CITRIC ACID/SODIUM CITRATE 300-500 MG
30 SOLUTION, ORAL ORAL ONCE
Refills: 0 | Status: DISCONTINUED | OUTPATIENT
Start: 2024-01-28 | End: 2024-01-28

## 2024-01-27 RX ORDER — CHLORHEXIDINE GLUCONATE 213 G/1000ML
1 SOLUTION TOPICAL DAILY
Refills: 0 | Status: DISCONTINUED | OUTPATIENT
Start: 2024-01-28 | End: 2024-01-28

## 2024-01-28 ENCOUNTER — TRANSCRIPTION ENCOUNTER (OUTPATIENT)
Age: 39
End: 2024-01-28

## 2024-01-28 ENCOUNTER — INPATIENT (INPATIENT)
Facility: HOSPITAL | Age: 39
LOS: 0 days | Discharge: HOME | End: 2024-01-29
Payer: COMMERCIAL

## 2024-01-28 VITALS
DIASTOLIC BLOOD PRESSURE: 65 MMHG | HEIGHT: 64 IN | TEMPERATURE: 98 F | SYSTOLIC BLOOD PRESSURE: 111 MMHG | RESPIRATION RATE: 20 BRPM | HEART RATE: 83 BPM | WEIGHT: 145.06 LBS

## 2024-01-28 DIAGNOSIS — O09.523 SUPERVISION OF ELDERLY MULTIGRAVIDA, THIRD TRIMESTER: ICD-10-CM

## 2024-01-28 DIAGNOSIS — Z22.330 CARRIER OF GROUP B STREPTOCOCCUS: ICD-10-CM

## 2024-01-28 DIAGNOSIS — Z98.890 OTHER SPECIFIED POSTPROCEDURAL STATES: Chronic | ICD-10-CM

## 2024-01-28 DIAGNOSIS — A49.1 STREPTOCOCCAL INFECTION, UNSPECIFIED SITE: ICD-10-CM

## 2024-01-28 LAB
ANISOCYTOSIS BLD QL: SLIGHT — SIGNIFICANT CHANGE UP
BASOPHILS # BLD AUTO: 0.09 K/UL — SIGNIFICANT CHANGE UP (ref 0–0.2)
BASOPHILS NFR BLD AUTO: 0.9 % — SIGNIFICANT CHANGE UP (ref 0–2)
BLD GP AB SCN SERPL QL: SIGNIFICANT CHANGE UP
BURR CELLS BLD QL SMEAR: PRESENT — SIGNIFICANT CHANGE UP
EOSINOPHIL # BLD AUTO: 0.09 K/UL — SIGNIFICANT CHANGE UP (ref 0–0.5)
EOSINOPHIL NFR BLD AUTO: 0.9 % — SIGNIFICANT CHANGE UP (ref 0–6)
GIANT PLATELETS BLD QL SMEAR: PRESENT — SIGNIFICANT CHANGE UP
HCT VFR BLD CALC: 26.5 % — LOW (ref 34.5–45)
HGB BLD-MCNC: 8.4 G/DL — LOW (ref 11.5–15.5)
LYMPHOCYTES # BLD AUTO: 0.84 K/UL — LOW (ref 1–3.3)
LYMPHOCYTES # BLD AUTO: 8.8 % — LOW (ref 13–44)
MANUAL SMEAR VERIFICATION: SIGNIFICANT CHANGE UP
MCHC RBC-ENTMCNC: 22.8 PG — LOW (ref 27–34)
MCHC RBC-ENTMCNC: 31.7 GM/DL — LOW (ref 32–36)
MCV RBC AUTO: 72 FL — LOW (ref 80–100)
MICROCYTES BLD QL: SLIGHT — SIGNIFICANT CHANGE UP
MONOCYTES # BLD AUTO: 0.25 K/UL — SIGNIFICANT CHANGE UP (ref 0–0.9)
MONOCYTES NFR BLD AUTO: 2.6 % — SIGNIFICANT CHANGE UP (ref 2–14)
NEUTROPHILS # BLD AUTO: 8 K/UL — HIGH (ref 1.8–7.4)
NEUTROPHILS NFR BLD AUTO: 83.3 % — HIGH (ref 43–77)
OVALOCYTES BLD QL SMEAR: SLIGHT — SIGNIFICANT CHANGE UP
PLAT MORPH BLD: ABNORMAL
PLATELET # BLD AUTO: 316 K/UL — SIGNIFICANT CHANGE UP (ref 150–400)
POIKILOCYTOSIS BLD QL AUTO: SLIGHT — SIGNIFICANT CHANGE UP
POLYCHROMASIA BLD QL SMEAR: SLIGHT — SIGNIFICANT CHANGE UP
RBC # BLD: 3.68 M/UL — LOW (ref 3.8–5.2)
RBC # FLD: 15.3 % — HIGH (ref 10.3–14.5)
RBC BLD AUTO: ABNORMAL
TARGETS BLD QL SMEAR: SLIGHT — SIGNIFICANT CHANGE UP
VARIANT LYMPHS # BLD: 3.5 % — SIGNIFICANT CHANGE UP (ref 0–6)
WBC # BLD: 9.6 K/UL — SIGNIFICANT CHANGE UP (ref 3.8–10.5)
WBC # FLD AUTO: 9.6 K/UL — SIGNIFICANT CHANGE UP (ref 3.8–10.5)

## 2024-01-28 RX ORDER — IBUPROFEN 200 MG
600 TABLET ORAL EVERY 6 HOURS
Refills: 0 | Status: COMPLETED | OUTPATIENT
Start: 2024-01-28 | End: 2024-12-26

## 2024-01-28 RX ORDER — MAGNESIUM HYDROXIDE 400 MG/1
30 TABLET, CHEWABLE ORAL
Refills: 0 | Status: DISCONTINUED | OUTPATIENT
Start: 2024-01-28 | End: 2024-01-30

## 2024-01-28 RX ORDER — OXYTOCIN 10 UNIT/ML
41.67 VIAL (ML) INJECTION
Qty: 20 | Refills: 0 | Status: DISCONTINUED | OUTPATIENT
Start: 2024-01-28 | End: 2024-01-30

## 2024-01-28 RX ORDER — ACETAMINOPHEN 500 MG
975 TABLET ORAL ONCE
Refills: 0 | Status: COMPLETED | OUTPATIENT
Start: 2024-01-28 | End: 2024-01-28

## 2024-01-28 RX ORDER — AMPICILLIN TRIHYDRATE 250 MG
2 CAPSULE ORAL ONCE
Refills: 0 | Status: COMPLETED | OUTPATIENT
Start: 2024-01-28 | End: 2024-01-28

## 2024-01-28 RX ORDER — DIBUCAINE 1 %
1 OINTMENT (GRAM) RECTAL EVERY 6 HOURS
Refills: 0 | Status: DISCONTINUED | OUTPATIENT
Start: 2024-01-28 | End: 2024-01-30

## 2024-01-28 RX ORDER — ACETAMINOPHEN 500 MG
975 TABLET ORAL
Refills: 0 | Status: DISCONTINUED | OUTPATIENT
Start: 2024-01-28 | End: 2024-01-30

## 2024-01-28 RX ORDER — OXYCODONE HYDROCHLORIDE 5 MG/1
5 TABLET ORAL ONCE
Refills: 0 | Status: DISCONTINUED | OUTPATIENT
Start: 2024-01-28 | End: 2024-01-30

## 2024-01-28 RX ORDER — TETANUS TOXOID, REDUCED DIPHTHERIA TOXOID AND ACELLULAR PERTUSSIS VACCINE, ADSORBED 5; 2.5; 8; 8; 2.5 [IU]/.5ML; [IU]/.5ML; UG/.5ML; UG/.5ML; UG/.5ML
0.5 SUSPENSION INTRAMUSCULAR ONCE
Refills: 0 | Status: DISCONTINUED | OUTPATIENT
Start: 2024-01-28 | End: 2024-01-30

## 2024-01-28 RX ORDER — OXYTOCIN 10 UNIT/ML
4 VIAL (ML) INJECTION
Qty: 30 | Refills: 0 | Status: DISCONTINUED | OUTPATIENT
Start: 2024-01-28 | End: 2024-01-30

## 2024-01-28 RX ORDER — LANOLIN
1 OINTMENT (GRAM) TOPICAL EVERY 6 HOURS
Refills: 0 | Status: DISCONTINUED | OUTPATIENT
Start: 2024-01-28 | End: 2024-01-30

## 2024-01-28 RX ORDER — AER TRAVELER 0.5 G/1
1 SOLUTION RECTAL; TOPICAL EVERY 4 HOURS
Refills: 0 | Status: DISCONTINUED | OUTPATIENT
Start: 2024-01-28 | End: 2024-01-30

## 2024-01-28 RX ORDER — SODIUM CHLORIDE 9 MG/ML
3 INJECTION INTRAMUSCULAR; INTRAVENOUS; SUBCUTANEOUS EVERY 8 HOURS
Refills: 0 | Status: DISCONTINUED | OUTPATIENT
Start: 2024-01-28 | End: 2024-01-30

## 2024-01-28 RX ORDER — HYDROCORTISONE 1 %
1 OINTMENT (GRAM) TOPICAL EVERY 6 HOURS
Refills: 0 | Status: DISCONTINUED | OUTPATIENT
Start: 2024-01-28 | End: 2024-01-30

## 2024-01-28 RX ORDER — KETOROLAC TROMETHAMINE 30 MG/ML
30 SYRINGE (ML) INJECTION ONCE
Refills: 0 | Status: DISCONTINUED | OUTPATIENT
Start: 2024-01-28 | End: 2024-01-28

## 2024-01-28 RX ORDER — SIMETHICONE 80 MG/1
80 TABLET, CHEWABLE ORAL EVERY 4 HOURS
Refills: 0 | Status: DISCONTINUED | OUTPATIENT
Start: 2024-01-28 | End: 2024-01-30

## 2024-01-28 RX ORDER — OXYCODONE HYDROCHLORIDE 5 MG/1
5 TABLET ORAL
Refills: 0 | Status: DISCONTINUED | OUTPATIENT
Start: 2024-01-28 | End: 2024-01-30

## 2024-01-28 RX ORDER — DIPHENHYDRAMINE HCL 50 MG
25 CAPSULE ORAL EVERY 6 HOURS
Refills: 0 | Status: DISCONTINUED | OUTPATIENT
Start: 2024-01-28 | End: 2024-01-30

## 2024-01-28 RX ORDER — BENZOCAINE 10 %
1 GEL (GRAM) MUCOUS MEMBRANE EVERY 6 HOURS
Refills: 0 | Status: DISCONTINUED | OUTPATIENT
Start: 2024-01-28 | End: 2024-01-30

## 2024-01-28 RX ORDER — PRAMOXINE HYDROCHLORIDE 150 MG/15G
1 AEROSOL, FOAM RECTAL EVERY 4 HOURS
Refills: 0 | Status: DISCONTINUED | OUTPATIENT
Start: 2024-01-28 | End: 2024-01-30

## 2024-01-28 RX ADMIN — Medication 975 MILLIGRAM(S): at 20:39

## 2024-01-28 RX ADMIN — Medication 200 GRAM(S): at 07:58

## 2024-01-28 RX ADMIN — Medication 108 GRAM(S): at 12:29

## 2024-01-28 RX ADMIN — SODIUM CHLORIDE 3 MILLILITER(S): 9 INJECTION INTRAMUSCULAR; INTRAVENOUS; SUBCUTANEOUS at 22:51

## 2024-01-28 RX ADMIN — Medication 108 GRAM(S): at 16:09

## 2024-01-28 RX ADMIN — Medication 4 MILLIUNIT(S)/MIN: at 08:57

## 2024-01-28 RX ADMIN — SODIUM CHLORIDE 125 MILLILITER(S): 9 INJECTION, SOLUTION INTRAVENOUS at 07:00

## 2024-01-28 RX ADMIN — CHLORHEXIDINE GLUCONATE 1 APPLICATION(S): 213 SOLUTION TOPICAL at 12:55

## 2024-01-28 RX ADMIN — Medication 975 MILLIGRAM(S): at 18:22

## 2024-01-28 RX ADMIN — Medication 30 MILLIGRAM(S): at 20:51

## 2024-01-28 NOTE — OB RN DELIVERY SUMMARY - NS_SEPSISRSKCALC_OBGYN_ALL_OB_FT
EOS calculated successfully. EOS Risk Factor: 0.5/1000 live births ([facility] national incidence); GA=39w6d; Temp=98.42; ROM=7.583; GBS='Positive'; Antibiotics='Broad spectrum antibiotics > 4 hrs prior to birth'

## 2024-01-28 NOTE — DISCHARGE NOTE OB - MEDICATION SUMMARY - MEDICATIONS TO TAKE
I will START or STAY ON the medications listed below when I get home from the hospital:    ibuprofen 600 mg oral tablet  -- 1 tab(s) by mouth every 6 hours  -- Indication: For pain    acetaminophen 325 mg oral tablet  -- 3 tab(s) by mouth every 6 hours  -- Indication: For Pain

## 2024-01-28 NOTE — OB PROVIDER DELIVERY SUMMARY - NSSELHIDDEN_OBGYN_ALL_OB_FT
[NS_DeliveryAttending1_OBGYN_ALL_OB_FT:[id number]],[NS_DeliveryAssist2_OBGYN_ALL_OB_FT:[email or web address]=],[NS_DeliveryRN_OBGYN_ALL_OB_FT:[email or web address]=]

## 2024-01-28 NOTE — DISCHARGE NOTE OB - PATIENT PORTAL LINK FT
You can access the FollowMyHealth Patient Portal offered by [facility] by registering at the following website: [email or web address]. By joining [employer]’s FollowMyHealth portal, you will also be able to view your health information using other applications (apps) compatible with our system.

## 2024-01-28 NOTE — DISCHARGE NOTE OB - PLAN OF CARE
Patient should transition to regular activity level. Resume regular diet. Patient should follow up with her OB for postpartum checkup in 2-3 weeks. Patient should call her doctor sooner if she develops fever or uncontrolled vaginal bleeding. Take medications as directed. Exclusive breast feeding for the first 6 months is recommended. Nothing per vagina for 6 weeks (incl. sex, douching, etc). If you have additional concerns, please inform your provider. Please continue taking iron with vitamin C daily

## 2024-01-28 NOTE — DISCHARGE NOTE OB - CARE PLAN
1 Principal Discharge DX:	Delivery normal  Assessment and plan of treatment:	Patient should transition to regular activity level. Resume regular diet. Patient should follow up with her OB for postpartum checkup in 2-3 weeks. Patient should call her doctor sooner if she develops fever or uncontrolled vaginal bleeding. Take medications as directed. Exclusive breast feeding for the first 6 months is recommended. Nothing per vagina for 6 weeks (incl. sex, douching, etc). If you have additional concerns, please inform your provider.   Principal Discharge DX:	Delivery normal  Assessment and plan of treatment:	Patient should transition to regular activity level. Resume regular diet. Patient should follow up with her OB for postpartum checkup in 2-3 weeks. Patient should call her doctor sooner if she develops fever or uncontrolled vaginal bleeding. Take medications as directed. Exclusive breast feeding for the first 6 months is recommended. Nothing per vagina for 6 weeks (incl. sex, douching, etc). If you have additional concerns, please inform your provider.  Secondary Diagnosis:	Anemia due to acute blood loss  Assessment and plan of treatment:	Please continue taking iron with vitamin C daily

## 2024-01-28 NOTE — DISCHARGE NOTE OB - PHYSICIAN SECTION COMPLETE
Problem: Safety  Goal: Will remain free from falls  Outcome: PROGRESSING AS EXPECTED  Note: Patient ambulating without additional assistance. Patient wearing non-slip socks and room is cluttered free. Call light an Patient is maintaining a balanced intake and output at this time. She is drinking fluids and expresses she has been able to go to the bathroom. Patient is progressing as expected. Will continue to monitor for changes in output status throughout the shift. d personal items all within reach. Patient reminded on call light if assistance is needed. Will continue to monitor for safety.       Outcome: PROGRESSING AS EXPECTED  Note: Patient ambulating without additional assistance. Patient wearing non-slip socks and room is cluttered free. Call light an Patient is maintaining a balanced intake and output at this time. She is drinking fluids and expresses she has been able to go to the bathroom. Patient is progressing as expected. Will continue to monitor for changes in output status throughout the shift. d personal items all within reach. Patient reminded on call light if assistance is needed. Will continue to monitor for safety.        Yes

## 2024-01-28 NOTE — OB PROVIDER H&P - HISTORY OF PRESENT ILLNESS
38 yr old  EDC 24 at 39.6wks admitted for elective induction of labor.  Pt denies complaints, no CTXs, no LOF, no VB, +FM.  Pregnancy complicated by +GBS culture at 36wks and NIPT suggestive of Klinefelter Syndrome.

## 2024-01-28 NOTE — OB PROVIDER H&P - NSFIRSTDATEVISIT_OBGYN_ALL_OB
---------------------  From: Freda Sanders CMA   To: BERNARDO Ware ([id number]);     Sent: 12/5/2019 4:12:05 PM CST  Subject: colonoscopy     LMTCB at 4:09pm.  I wanted to let him know that when we sent in his order for his colonoscopy Dr. Valdivia's office contacted us stating that pt had one done in 2018 and his recommendations was to repeat in 3 years. We did not have a record of this colonoscopy. Dr. Mcdaniels reviewed these results and agrees that pt can wait until 2021.  
Known

## 2024-01-28 NOTE — OB RN PATIENT PROFILE - FALL HARM RISK - UNIVERSAL INTERVENTIONS
Bed in lowest position, wheels locked, appropriate side rails in place/Call bell, personal items and telephone in reach/Instruct patient to call for assistance before getting out of bed or chair/Non-slip footwear when patient is out of bed/[place] to call system/Physically safe environment - no spills, clutter or unnecessary equipment/Purposeful Proactive Rounding/Room/bathroom lighting operational, light cord in reach

## 2024-01-28 NOTE — DISCHARGE NOTE OB - CARE PROVIDER_API CALL
Mariann Colon  Obstetrics and Gynecology  [address] [place] [id number]  Phone: [phone]  Fax: [phone]  Follow Up Time: 2 weeks

## 2024-01-28 NOTE — OB RN DELIVERY SUMMARY - NSSELHIDDEN_OBGYN_ALL_OB_FT
[NS_DeliveryAttending1_OBGYN_ALL_OB_FT:[id number]],[NS_DeliveryAssist2_OBGYN_ALL_OB_FT:[email or web address]=],[NS_DeliveryRN_OBGYN_ALL_OB_FT:[email or web address]=]

## 2024-01-28 NOTE — OB PROVIDER DELIVERY SUMMARY - NSPROVIDERDELIVERYNOTE_OBGYN_ALL_OB_FT
Patient noted to be fully dilated, and after a period of pushing, patient was prepped and draped for delivery. In conjunction with maternal effort, she delivered a viable male infant. Head delivered EDMUNDO with tight nuchal x1, clamped and cut at perineum. Anterior shoulder delivered with right compound arm and delivery of the rest of the body without difficulty. Cord blood collected for blood gas and G6PD deficiency testing. Placenta delivered spontaneously and intact, no gross pathology noted. Perineum and vagina were inspected and a 2nd degree perineal laceration was noted and repaired with 2-0 chromic. A left periurethral tear was also noted and reapproximated with 2-0 Chromic. Excellent hemostasis obtained.    EBL 165cc  Sex: M, Delivery Time: , [place] Weight: tbd g, Patient noted to be fully dilated, and after a period of pushing (approx 5min), patient was prepped and draped for delivery. In conjunction with maternal effort, she delivered a viable male infant. Head delivered EDMUNDO with tight nuchal cord x1, clamped and cut at perineum. Anterior shoulder delivered with right compound arm and delivery of the rest of the body without difficulty. Cord blood collected for blood gas and G6PD deficiency testing. Placenta delivered spontaneously and intact, no gross pathology noted. Perineum and vagina were inspected and a 2nd degree perineal laceration was noted and repaired with 2-0 chromic. A left periurethral tear was also noted and reapproximated with 2-0 Chromic. Excellent hemostasis obtained.    EBL 165cc  Sex: M, Delivery Time: ,  Weight: tbd

## 2024-01-28 NOTE — OB PROVIDER H&P - ASSESSMENT
38 yr old  EDC 24 at 39.6wks admitted for elective induction of labor.  Pt denies complaints, no CTXs, no LOF, no VB, +FM.  Pregnancy complicated by +GBS culture at 36wks and NIPT suggestive of Klinefelter Syndrome.    Cervix 2cm/30%/-3 on exam.  Membranes intact. 38 yr old  EDC 24 at 39.6wks admitted for elective induction of labor.  Pt denies complaints, no CTXs, no LOF, no VB, +FM.  Pregnancy complicated by +GBS culture at 36wks and NIPT suggestive of Klinefelter Syndrome.    Cervix 2cm/30%/-3 on exam.  Membranes intact.    Plan for pitocin induction, Ampicillin for GBS+ status. 38 yr old  EDC 24 at 39.6wks admitted for elective induction of labor.  Pt denies complaints, no CTXs, no LOF, no VB, +FM.  Pregnancy complicated by +GBS culture at 36wks and NIPT suggestive of Klinefelter Syndrome.    Cervix 2cm/30%/-3 on exam.  Membranes intact.  +CTXs on monitoring, Q6 min on toco, not felt by pt.    Plan for pitocin induction, Ampicillin for GBS+ status.

## 2024-01-28 NOTE — OB PROVIDER IHI INDUCTION/AUGMENTATION NOTE - NS_CHECKALL_OBGYN_ALL_OB
Order was written/H&P was completed/Induction / Augmentation was discussed Order was written/H&P was completed/Contractions pattern was reviewed/FHR was reviewed/Induction / Augmentation was discussed

## 2024-01-28 NOTE — DISCHARGE NOTE OB - NS MD DC FALL RISK RISK
For information on Fall & Injury Prevention, visit: [email or web address] OR  [email or web address] OR  https://www.cdc.gov/steadi/patient.html

## 2024-01-29 VITALS
SYSTOLIC BLOOD PRESSURE: 112 MMHG | OXYGEN SATURATION: 98 % | RESPIRATION RATE: 18 BRPM | HEART RATE: 84 BPM | TEMPERATURE: 98 F | DIASTOLIC BLOOD PRESSURE: 72 MMHG

## 2024-01-29 LAB
HCT VFR BLD CALC: 24.5 % — LOW (ref 34.5–45)
HGB BLD-MCNC: 7.2 G/DL — LOW (ref 11.5–15.5)
T PALLIDUM AB TITR SER: NEGATIVE — SIGNIFICANT CHANGE UP

## 2024-01-29 PROCEDURE — 86900 BLOOD TYPING SEROLOGIC ABO: CPT

## 2024-01-29 PROCEDURE — 36415 COLL VENOUS BLD VENIPUNCTURE: CPT

## 2024-01-29 PROCEDURE — 86780 TREPONEMA PALLIDUM: CPT

## 2024-01-29 PROCEDURE — 59050 FETAL MONITOR W/REPORT: CPT

## 2024-01-29 PROCEDURE — 86850 RBC ANTIBODY SCREEN: CPT

## 2024-01-29 PROCEDURE — 85025 COMPLETE CBC W/AUTO DIFF WBC: CPT

## 2024-01-29 PROCEDURE — 85014 HEMATOCRIT: CPT

## 2024-01-29 PROCEDURE — 85018 HEMOGLOBIN: CPT

## 2024-01-29 PROCEDURE — 86901 BLOOD TYPING SEROLOGIC RH(D): CPT

## 2024-01-29 RX ORDER — ASCORBIC ACID 60 MG
500 TABLET,CHEWABLE ORAL DAILY
Refills: 0 | Status: DISCONTINUED | OUTPATIENT
Start: 2024-01-29 | End: 2024-01-30

## 2024-01-29 RX ORDER — ASCORBIC ACID 60 MG
1 TABLET,CHEWABLE ORAL
Qty: 28 | Refills: 0
Start: 2024-01-29 | End: 2024-02-25

## 2024-01-29 RX ORDER — ACETAMINOPHEN 500 MG
3 TABLET ORAL
Qty: 360 | Refills: 0
Start: 2024-01-29 | End: 2024-02-27

## 2024-01-29 RX ORDER — IBUPROFEN 200 MG
600 TABLET ORAL EVERY 6 HOURS
Refills: 0 | Status: DISCONTINUED | OUTPATIENT
Start: 2024-01-29 | End: 2024-01-30

## 2024-01-29 RX ORDER — FERROUS SULFATE 325(65) MG
1 TABLET ORAL
Qty: 28 | Refills: 0
Start: 2024-01-29 | End: 2024-02-25

## 2024-01-29 RX ORDER — FERROUS SULFATE 325(65) MG
325 TABLET ORAL DAILY
Refills: 0 | Status: DISCONTINUED | OUTPATIENT
Start: 2024-01-29 | End: 2024-01-30

## 2024-01-29 RX ORDER — IBUPROFEN 200 MG
1 TABLET ORAL
Qty: 120 | Refills: 0
Start: 2024-01-29 | End: 2024-02-27

## 2024-01-29 RX ADMIN — Medication 975 MILLIGRAM(S): at 02:06

## 2024-01-29 RX ADMIN — Medication 600 MILLIGRAM(S): at 13:16

## 2024-01-29 RX ADMIN — Medication 975 MILLIGRAM(S): at 15:29

## 2024-01-29 RX ADMIN — Medication 1 TABLET(S): at 13:16

## 2024-01-29 RX ADMIN — Medication 975 MILLIGRAM(S): at 10:00

## 2024-01-29 RX ADMIN — Medication 975 MILLIGRAM(S): at 09:00

## 2024-01-29 RX ADMIN — SODIUM CHLORIDE 3 MILLILITER(S): 9 INJECTION INTRAMUSCULAR; INTRAVENOUS; SUBCUTANEOUS at 05:41

## 2024-01-29 RX ADMIN — Medication 975 MILLIGRAM(S): at 21:00

## 2024-01-29 RX ADMIN — Medication 600 MILLIGRAM(S): at 05:32

## 2024-01-30 NOTE — PROGRESS NOTE ADULT - ASSESSMENT
ASSESSMENT:   ADRIANA COX is a 38y  PPD2 s/p . Patient has no other complaints at this time, is otherwise clinically and hemodynamically stable.   [place] boy is with patient at bedside. Patient reports she desires circumcision.   Hgb: 8.4 > 7.2  Rub: I/I    #Postpartum  - Continue routine post-partum care  - Pain management PRN  - Encourage maternal- bonding    - Diet: Regular, advance as tolerated  - DVT ppx: Ambulation encouraged  - Dispo: Home PPD2 per attending's approval   
INTERVAL HPI/OVERNIGHT EVENTS:  38y Female s/p labor epidural on 1/28/24    Vital Signs Last 24 Hrs  T(C): 37.2 (29 Jan 2024 05:00), Max: 37.2 (29 Jan 2024 05:00)  T(F): 98.9 (29 Jan 2024 05:00), Max: 98.9 (29 Jan 2024 05:00)  HR: 96 (29 Jan 2024 05:00) (92 - 112)  BP: 108/62 (29 Jan 2024 05:00) (108/62 - 140/71)  BP(mean): --  RR: 18 (29 Jan 2024 05:00) (16 - 18)  SpO2: 97% (29 Jan 2024 05:00) (97% - 99%)    Parameters below as of 29 Jan 2024 05:00  Patient On (Oxygen Delivery Method): room air            Patient's overall anesthesia satisfaction: Positive    Patient doing well     No headache      No residual numbness or weakness, sensory and motor function intact    No anesthetic complications or complaints noted or reported                 
ASSESSMENT:   ADRIANA COX is a 38y  PPD1 s/p . Patient has no other complaints at this time, is otherwise clinically and hemodynamically stable.   [place] boy is with patient at bedside. Patient reports she desires circumcision.   Hgb: 8.4 > am labs, consider venofer  Rub: I/I    #Postpartum  - Continue routine post-partum care  - Pain management PRN  - Encourage maternal- bonding    - Diet: Regular, advance as tolerated  - DVT ppx: Ambulation encouraged  - Dispo: Home PPD2 per attending's approval

## 2024-01-30 NOTE — PROGRESS NOTE ADULT - SUBJECTIVE AND OBJECTIVE BOX
SUBJECTIVE:  Patient seen and examined at bedside this morning. No acute events overnight.     OBJECTIVE:  Vital Signs Last 24 Hrs  T(C): 37.2 (29 Jan 2024 05:00), Max: 37.2 (29 Jan 2024 05:00)  T(F): 98.9 (29 Jan 2024 05:00), Max: 98.9 (29 Jan 2024 05:00)  HR: 96 (29 Jan 2024 05:00) (69 - 116)  BP: 108/62 (29 Jan 2024 05:00) (104/60 - 140/71)    Physical exam:  General: AOx3, NAD.  Heart: S1/S2 with regular rate and normal rhythm.  Lungs: CTABL, no crackles or wheezing.   Abdomen: +BS, Soft, appropriately tender, nondistended, no guarding or rebound tenderness, firm uterine fundus at umbilicus  Ext: BL LE reveal minimal swelling, no popliteal tenderness or skin changes.     LABS:                        8.4    9.60  )-----------( 316      ( 28 Jan 2024 08:19 )             26.5       Antibody Screen: NEG (01-28-24 @ 07:47)    
SUBJECTIVE:  Patient seen and examined at bedside this morning. No acute events overnight.     OBJECTIVE:  Vital Signs Last 24 Hrs  T(C): 36.9 (29 Jan 2024 21:45), Max: 37.2 (29 Jan 2024 05:00)  T(F): 98.5 (29 Jan 2024 21:45), Max: 98.9 (29 Jan 2024 05:00)  HR: 84 (29 Jan 2024 21:45) (84 - 96)  BP: 112/72 (29 Jan 2024 21:45) (108/62 - 112/72)                       7.2    x     )-----------( x        ( 29 Jan 2024 05:45 )             24.5                         8.4    9.60  )-----------( 316      ( 28 Jan 2024 08:19 )             26.5   Physical exam:  General: AOx3, NAD.  Heart: S1/S2 with regular rate and normal rhythm.  Lungs: CTABL, no crackles or wheezing.   Abdomen: +BS, Soft, appropriately tender, nondistended, no guarding or rebound tenderness, firm uterine fundus at umbilicus  Ext: BL LE reveal minimal swelling, no popliteal tenderness or skin changes.         Antibody Screen: NEG (01-28-24 @ 07:47)

## 2024-10-22 ENCOUNTER — TRANSCRIPTION ENCOUNTER (OUTPATIENT)
Age: 39
End: 2024-10-22

## 2024-12-02 NOTE — OB RN PATIENT PROFILE - NSLOCATIONOFMDOFFICE_OBGYN_ALL_OB_FT
Per SICU note on 11/30: New facial droop and LUE rigidity per family, CTH negative  CT abd/pel: Dilated small and large bowel consistent with an ileus. No evidence of portal venous gas as questioned on liver Doppler - F/u GI recs  CT chest 11/25: Groundglass opacities in the right lower and left upper lobes, possibly infectious in nature. Per SICU note on 11/30: New facial droop and LUE rigidity per family, CTH negative  CT abd/pel: Dilated small and large bowel consistent with an ileus. No evidence of portal venous gas as questioned on liver Doppler - F/u GI recs  CT chest 11/25: Groundglass opacities in the right lower and left upper lobes, possibly infectious in nature. [address] [place] [address]

## 2025-04-16 NOTE — OB PROVIDER TRIAGE NOTE - NS_FHRDECEL_OBGYN_ALL_OB
Per family, this is common when pt is hospitalized  - Suspect low temps and bradycardia are related to dysautonomia  - Likely secondary to adrenal insufficiency      No Decelerations